# Patient Record
Sex: MALE | NOT HISPANIC OR LATINO | URBAN - METROPOLITAN AREA
[De-identification: names, ages, dates, MRNs, and addresses within clinical notes are randomized per-mention and may not be internally consistent; named-entity substitution may affect disease eponyms.]

---

## 2021-08-25 ENCOUNTER — WEB ENCOUNTER (OUTPATIENT)
Dept: URBAN - METROPOLITAN AREA CLINIC 107 | Facility: CLINIC | Age: 76
End: 2021-08-25

## 2021-08-25 ENCOUNTER — OFFICE VISIT (OUTPATIENT)
Dept: URBAN - METROPOLITAN AREA CLINIC 107 | Facility: CLINIC | Age: 76
End: 2021-08-25
Payer: MEDICARE

## 2021-08-25 VITALS
RESPIRATION RATE: 18 BRPM | SYSTOLIC BLOOD PRESSURE: 119 MMHG | HEART RATE: 90 BPM | WEIGHT: 178 LBS | DIASTOLIC BLOOD PRESSURE: 72 MMHG | HEIGHT: 72 IN | BODY MASS INDEX: 24.11 KG/M2 | TEMPERATURE: 97.7 F

## 2021-08-25 DIAGNOSIS — R15.9 INCONTINENCE OF FECES: ICD-10-CM

## 2021-08-25 DIAGNOSIS — R19.7 DIARRHEA: ICD-10-CM

## 2021-08-25 PROCEDURE — 99204 OFFICE O/P NEW MOD 45 MIN: CPT | Performed by: INTERNAL MEDICINE

## 2021-08-25 RX ORDER — COLESEVELAM HYDROCHLORIDE 625 MG/1
1 TABLET TABLET, FILM COATED ORAL TWICE A DAY
Qty: 60 TABLET | Refills: 3 | OUTPATIENT
Start: 2021-08-25

## 2021-08-25 RX ORDER — FENUGREEK SEED/BL.THISTLE/ANIS 340 MG
AS DIRECTED CAPSULE ORAL
Status: ACTIVE | COMMUNITY

## 2021-08-25 RX ORDER — WARFARIN SODIUM 5 MG/1
1 TABLET TABLET ORAL ONCE A DAY
Status: ACTIVE | COMMUNITY

## 2021-08-25 NOTE — HPI-TODAY'S VISIT:
76-year-old male with a history of recurrent diverticulitis s/p partial colectomy (June 2021), referred by Dr. Jeremy Gusman for evaluation of diarrhea.  A copy of this document is being forwarded to the referring provider. This is an urgent same day add on.  Earlier this year, while travelling, he experienced an exacerbation of lower abdominal pain concerning for recurrent diverticulitis. He was started on antibiotics, with minimal improvement in his symptoms. Upon his return home, he was seen by his PCP at which time a CT reportedly showed an aortic aneurysm, pulmonary embolus, diverticulitis, and bladder stones. He ultimately underwent numerous surgeries, including a bowel resection in earlier June by Dr. Kendall at [facility]. Prior to his bladder surgery aat the end of June, Dr. Adams started him on multiple antibiotics. Since this time, he has experienced persistent diarrhea. He is having 20-25 liquid stools per day with associated nocturnal defecation and stool incontinence. He denies associated blood per rectum. Per the consult note, a H pylori stool antigen was negative. He was started on a probiotic, without improvement. He has abdominal cramping prior to a bowel movement which subsides with defecation. He denies nausea, vomiting, or fever. He had a colonoscopy earlier this year at [facility].

## 2021-09-02 ENCOUNTER — TELEPHONE ENCOUNTER (OUTPATIENT)
Dept: URBAN - METROPOLITAN AREA CLINIC 113 | Facility: CLINIC | Age: 76
End: 2021-09-02

## 2021-09-02 RX ORDER — SODIUM, POTASSIUM,MAG SULFATES 17.5-3.13G
354 ML SOLUTION, RECONSTITUTED, ORAL ORAL ONCE
Qty: 354 MILLILITER | Refills: 0 | OUTPATIENT
Start: 2021-09-03 | End: 2021-09-04

## 2021-09-08 ENCOUNTER — TELEPHONE ENCOUNTER (OUTPATIENT)
Dept: URBAN - METROPOLITAN AREA CLINIC 113 | Facility: CLINIC | Age: 76
End: 2021-09-08

## 2021-09-22 ENCOUNTER — OFFICE VISIT (OUTPATIENT)
Dept: URBAN - METROPOLITAN AREA SURGERY CENTER 25 | Facility: SURGERY CENTER | Age: 76
End: 2021-09-22
Payer: MEDICARE

## 2021-09-22 ENCOUNTER — CLAIMS CREATED FROM THE CLAIM WINDOW (OUTPATIENT)
Dept: URBAN - METROPOLITAN AREA CLINIC 4 | Facility: CLINIC | Age: 76
End: 2021-09-22
Payer: MEDICARE

## 2021-09-22 DIAGNOSIS — D12.4 BENIGN NEOPLASM OF DESCENDING COLON: ICD-10-CM

## 2021-09-22 DIAGNOSIS — K63.89 POLYP, HYPERPLASTIC: ICD-10-CM

## 2021-09-22 DIAGNOSIS — K52.9 CHRONIC DIARRHEA: ICD-10-CM

## 2021-09-22 DIAGNOSIS — D12.2 ADENOMA OF ASCENDING COLON: ICD-10-CM

## 2021-09-22 DIAGNOSIS — K56.699 OTHER INTESTNL OBST UNSP AS TO PARTIAL VERSUS COMPLETE OBST: ICD-10-CM

## 2021-09-22 DIAGNOSIS — D12.4 ADENOMA OF DESCENDING COLON: ICD-10-CM

## 2021-09-22 DIAGNOSIS — D12.3 BENIGN NEOPLASM OF TRANSVERSE COLON: ICD-10-CM

## 2021-09-22 DIAGNOSIS — D12.3 ADENOMA OF TRANSVERSE COLON: ICD-10-CM

## 2021-09-22 DIAGNOSIS — D12.2 BENIGN NEOPLASM OF ASCENDING COLON: ICD-10-CM

## 2021-09-22 PROCEDURE — 45385 COLONOSCOPY W/LESION REMOVAL: CPT | Performed by: INTERNAL MEDICINE

## 2021-09-22 PROCEDURE — 88305 TISSUE EXAM BY PATHOLOGIST: CPT | Performed by: PATHOLOGY

## 2021-09-22 PROCEDURE — G8907 PT DOC NO EVENTS ON DISCHARG: HCPCS | Performed by: INTERNAL MEDICINE

## 2021-09-22 PROCEDURE — 45380 COLONOSCOPY AND BIOPSY: CPT | Performed by: INTERNAL MEDICINE

## 2021-09-22 RX ORDER — FENUGREEK SEED/BL.THISTLE/ANIS 340 MG
AS DIRECTED CAPSULE ORAL
Status: ACTIVE | COMMUNITY

## 2021-09-22 RX ORDER — COLESEVELAM HYDROCHLORIDE 625 MG/1
1 TABLET TABLET, FILM COATED ORAL TWICE A DAY
Qty: 60 TABLET | Refills: 3 | Status: ACTIVE | COMMUNITY
Start: 2021-08-25

## 2021-09-22 RX ORDER — WARFARIN SODIUM 5 MG/1
1 TABLET TABLET ORAL ONCE A DAY
Status: ACTIVE | COMMUNITY

## 2021-09-27 ENCOUNTER — OFFICE VISIT (OUTPATIENT)
Dept: URBAN - METROPOLITAN AREA CLINIC 107 | Facility: CLINIC | Age: 76
End: 2021-09-27

## 2021-10-13 ENCOUNTER — OFFICE VISIT (OUTPATIENT)
Dept: URBAN - METROPOLITAN AREA CLINIC 113 | Facility: CLINIC | Age: 76
End: 2021-10-13
Payer: MEDICARE

## 2021-10-13 ENCOUNTER — DASHBOARD ENCOUNTERS (OUTPATIENT)
Age: 76
End: 2021-10-13

## 2021-10-13 VITALS
SYSTOLIC BLOOD PRESSURE: 137 MMHG | TEMPERATURE: 97.8 F | DIASTOLIC BLOOD PRESSURE: 85 MMHG | WEIGHT: 178 LBS | HEART RATE: 77 BPM | RESPIRATION RATE: 20 BRPM | HEIGHT: 72 IN | BODY MASS INDEX: 24.11 KG/M2

## 2021-10-13 DIAGNOSIS — D36.9 TUBULAR ADENOMA: ICD-10-CM

## 2021-10-13 DIAGNOSIS — K21.9 GASTROESOPHAGEAL REFLUX DISEASE, UNSPECIFIED WHETHER ESOPHAGITIS PRESENT: ICD-10-CM

## 2021-10-13 DIAGNOSIS — K57.90 DIVERTICULOSIS: ICD-10-CM

## 2021-10-13 DIAGNOSIS — R19.7 DIARRHEA, UNSPECIFIED TYPE: ICD-10-CM

## 2021-10-13 PROBLEM — 397881000: Status: ACTIVE | Noted: 2021-10-13

## 2021-10-13 PROBLEM — 72042002 INCONTINENCE OF FECES: Status: ACTIVE | Noted: 2021-08-25

## 2021-10-13 PROBLEM — 235595009: Status: ACTIVE | Noted: 2021-10-13

## 2021-10-13 PROBLEM — 444408007: Status: ACTIVE | Noted: 2021-10-13

## 2021-10-13 PROBLEM — 62315008: Status: ACTIVE | Noted: 2021-10-13

## 2021-10-13 PROCEDURE — 99214 OFFICE O/P EST MOD 30 MIN: CPT | Performed by: NURSE PRACTITIONER

## 2021-10-13 RX ORDER — WARFARIN SODIUM 5 MG/1
1 TABLET TABLET ORAL ONCE A DAY
Status: ACTIVE | COMMUNITY

## 2021-10-13 RX ORDER — COLESEVELAM HYDROCHLORIDE 625 MG/1
1 TABLET TABLET, FILM COATED ORAL TWICE A DAY
Qty: 60 TABLET | Refills: 3 | Status: ACTIVE | COMMUNITY
Start: 2021-08-25

## 2021-10-13 RX ORDER — FENUGREEK SEED/BL.THISTLE/ANIS 340 MG
AS DIRECTED CAPSULE ORAL
Status: ACTIVE | COMMUNITY

## 2021-10-13 NOTE — HPI-TODAY'S VISIT:
76 yom presenting for follow up after a colonoscopy.  He was seen in the office on 8/25/21 by Janny Kendrick NP for evaluation of diarrhea with nocturnal stools and fecal incontinence. Diarrhea was likely multifactorial from recent ABX use and prior colon surgery. He was planned for stool studies to rule out infectious process including C. difficile. He was provided a trial of Welchol for possible component of bile salt induced diarrhea. Stool cultures were negative, but stool lactoferrin was positive suggesting an inflammatory process. He was recommended a colonoscopy.   A colonoscopy was performed on 9/22/21. Good prep with BBPS score of 7. Normal ileum. Nonbleeding internal hemorrhoids. Diverticulosis in the sigmoid and descending colon. Significant colon spasm. Normal mucosa throughout the entire colon, s/p biopsies. Removal of a 7 mm polyp from the ascending colon, 6 mm polyp from the transverse colon, 10 mm polyps from the descending colon, 8 mm polyp from the descending colon, tattoo with post polypectomy scar found in the descending colon, patent end to side colo-colonic anastomosis that was erythematous with notable stricturing at the anastomosis. Random colon biopsies were negative.  Colon polyps were tubular adenomas. A repeat colonoscopy is recommended in 1 years time. Lactoferrin was likely elevated due to the erythema and friability noted at the anastomosis site.   Patient admits to continued diarrhea however frequency has decreased to every couple of days. He says the diarrhea is generally liquid and can occur up to 8 times a day. He is taking Welchol twice daily as previously directed and states it has helped as he is no longer having diarrhea every single day. Denies nocturnal bowel movements, melena or BRBPR. He also admits to occasional lower abdominal pressure 2-3 times per week. The pressure resolves with ingestion of mints per patient. He is unaware of any triggers or whether it precedes his bouts of diarrhea. He is not currently on any fiber supplements. Denies nausea and vomiting. He has had acid reflux in the past which is well controlled with OTC Nexium. Otherwise, patient has no other complaints today.

## 2021-11-22 ENCOUNTER — TELEPHONE ENCOUNTER (OUTPATIENT)
Dept: URBAN - METROPOLITAN AREA CLINIC 113 | Facility: CLINIC | Age: 76
End: 2021-11-22

## 2021-12-15 ENCOUNTER — OFFICE VISIT (OUTPATIENT)
Dept: URBAN - METROPOLITAN AREA CLINIC 113 | Facility: CLINIC | Age: 76
End: 2021-12-15